# Patient Record
Sex: FEMALE | Race: ASIAN | NOT HISPANIC OR LATINO
[De-identification: names, ages, dates, MRNs, and addresses within clinical notes are randomized per-mention and may not be internally consistent; named-entity substitution may affect disease eponyms.]

---

## 2018-01-05 ENCOUNTER — APPOINTMENT (OUTPATIENT)
Dept: INTERNAL MEDICINE | Facility: CLINIC | Age: 58
End: 2018-01-05
Payer: COMMERCIAL

## 2018-01-05 VITALS
OXYGEN SATURATION: 98 % | BODY MASS INDEX: 21.81 KG/M2 | HEART RATE: 98 BPM | DIASTOLIC BLOOD PRESSURE: 83 MMHG | TEMPERATURE: 98.4 F | WEIGHT: 120 LBS | RESPIRATION RATE: 17 BRPM | SYSTOLIC BLOOD PRESSURE: 130 MMHG

## 2018-01-05 DIAGNOSIS — Z87.09 PERSONAL HISTORY OF OTHER DISEASES OF THE RESPIRATORY SYSTEM: ICD-10-CM

## 2018-01-05 DIAGNOSIS — R05 COUGH: ICD-10-CM

## 2018-01-05 DIAGNOSIS — Z23 ENCOUNTER FOR IMMUNIZATION: ICD-10-CM

## 2018-01-05 DIAGNOSIS — Z12.31 ENCOUNTER FOR SCREENING MAMMOGRAM FOR MALIGNANT NEOPLASM OF BREAST: ICD-10-CM

## 2018-01-05 PROCEDURE — 99214 OFFICE O/P EST MOD 30 MIN: CPT

## 2018-01-05 RX ORDER — MOXIFLOXACIN HYDROCHLORIDE TABLETS, 400 MG 400 MG/1
400 TABLET, FILM COATED ORAL DAILY
Qty: 7 | Refills: 0 | Status: ACTIVE | COMMUNITY
Start: 2018-01-05 | End: 1900-01-01

## 2018-01-05 RX ORDER — BENZONATATE 100 MG/1
100 CAPSULE ORAL
Qty: 30 | Refills: 5 | Status: ACTIVE | COMMUNITY
Start: 2018-01-05 | End: 1900-01-01

## 2019-01-30 ENCOUNTER — APPOINTMENT (OUTPATIENT)
Dept: CARDIOLOGY | Facility: CLINIC | Age: 59
End: 2019-01-30
Payer: COMMERCIAL

## 2019-01-30 VITALS
DIASTOLIC BLOOD PRESSURE: 97 MMHG | BODY MASS INDEX: 22.63 KG/M2 | HEART RATE: 88 BPM | SYSTOLIC BLOOD PRESSURE: 161 MMHG | TEMPERATURE: 98 F | OXYGEN SATURATION: 98 % | RESPIRATION RATE: 17 BRPM | WEIGHT: 123 LBS | HEIGHT: 62 IN

## 2019-01-30 DIAGNOSIS — Z82.49 FAMILY HISTORY OF ISCHEMIC HEART DISEASE AND OTHER DISEASES OF THE CIRCULATORY SYSTEM: ICD-10-CM

## 2019-01-30 DIAGNOSIS — R94.31 ABNORMAL ELECTROCARDIOGRAM [ECG] [EKG]: ICD-10-CM

## 2019-01-30 PROCEDURE — 99203 OFFICE O/P NEW LOW 30 MIN: CPT | Mod: 25

## 2019-01-30 PROCEDURE — 93306 TTE W/DOPPLER COMPLETE: CPT

## 2019-01-30 PROCEDURE — 93015 CV STRESS TEST SUPVJ I&R: CPT

## 2019-01-30 PROCEDURE — 93000 ELECTROCARDIOGRAM COMPLETE: CPT | Mod: 59

## 2019-01-30 PROCEDURE — ZZZZZ: CPT

## 2019-01-31 PROBLEM — R94.31 ABNORMAL ECG: Status: ACTIVE | Noted: 2019-01-30

## 2019-03-28 PROBLEM — Z82.49 FAMILY HISTORY OF CORONARY ARTERY DISEASE: Status: ACTIVE | Noted: 2019-03-28

## 2019-03-28 NOTE — DISCUSSION/SUMMARY
[FreeTextEntry1] : 58  year-old female with no significant PMH s/p hysterectomy presents for evaluation for abnormal ECG.  Patient reports no cardiac history.  Patient was noted to have an abnormal ECG by PCP, showing inverted T waves V2-V3.  Patient denies CP.  Patient denies SOB.  Patient denies palpitations.  Patient denies h/o syncope. \par \par (1) Abnormal ECG, inverted T waves V2-V3 - Patient underwent an echocardiogram and it showed normal LV function without significant valvular pathology. Patient underwent a treadmill stress test and completed 9 minutes of Rickie protocol.  There were upsloping ST depressions on ECG but no symptoms.  Patient appears to be at low risk for having significant CAD.  Patient's ECG abnormality does not appear to indicate any cardiac pathology at this time.\par \par (2) Followup - as needed.

## 2019-03-28 NOTE — PHYSICAL EXAM
[General Appearance - Well Developed] : well developed [Normal Appearance] : normal appearance [Well Groomed] : well groomed [General Appearance - Well Nourished] : well nourished [No Deformities] : no deformities [General Appearance - In No Acute Distress] : no acute distress [Normal Conjunctiva] : the conjunctiva exhibited no abnormalities [Eyelids - No Xanthelasma] : the eyelids demonstrated no xanthelasmas [Normal Oral Mucosa] : normal oral mucosa [No Oral Pallor] : no oral pallor [No Oral Cyanosis] : no oral cyanosis [Normal Jugular Venous A Waves Present] : normal jugular venous A waves present [Normal Jugular Venous V Waves Present] : normal jugular venous V waves present [No Jugular Venous Leon A Waves] : no jugular venous leon A waves [Heart Rate And Rhythm] : heart rate and rhythm were normal [Heart Sounds] : normal S1 and S2 [Murmurs] : no murmurs present [Respiration, Rhythm And Depth] : normal respiratory rhythm and effort [Exaggerated Use Of Accessory Muscles For Inspiration] : no accessory muscle use [Auscultation Breath Sounds / Voice Sounds] : lungs were clear to auscultation bilaterally [Abdomen Soft] : soft [Abdomen Tenderness] : non-tender [Abdomen Mass (___ Cm)] : no abdominal mass palpated [Abnormal Walk] : normal gait [Gait - Sufficient For Exercise Testing] : the gait was sufficient for exercise testing [Nail Clubbing] : no clubbing of the fingernails [Cyanosis, Localized] : no localized cyanosis [Petechial Hemorrhages (___cm)] : no petechial hemorrhages [] : no ischemic changes [Oriented To Time, Place, And Person] : oriented to person, place, and time [Affect] : the affect was normal [Mood] : the mood was normal [No Anxiety] : not feeling anxious [Arterial Pulses Normal] : the arterial pulses were normal [Edema] : no peripheral edema present

## 2019-03-28 NOTE — HISTORY OF PRESENT ILLNESS
[FreeTextEntry1] : 58  year-old female with no significant PMH s/p hysterectomy presents for evaluation for abnormal ECG.  Patient reports no cardiac history.  Patient was noted to have an abnormal ECG by PCP, showing inverted T waves V2-V3.  Patient denies CP.  Patient denies SOB.  Patient denies palpitations.  Patient denies h/o syncope.

## 2020-12-16 PROBLEM — Z87.09 HISTORY OF ACUTE BRONCHITIS: Status: RESOLVED | Noted: 2018-01-05 | Resolved: 2020-12-16
